# Patient Record
Sex: FEMALE | Race: WHITE
[De-identification: names, ages, dates, MRNs, and addresses within clinical notes are randomized per-mention and may not be internally consistent; named-entity substitution may affect disease eponyms.]

---

## 2017-07-18 ENCOUNTER — HOSPITAL ENCOUNTER (EMERGENCY)
Dept: HOSPITAL 14 - H.ER | Age: 22
Discharge: HOME | End: 2017-07-18
Payer: MEDICAID

## 2017-07-18 VITALS
RESPIRATION RATE: 18 BRPM | HEART RATE: 78 BPM | DIASTOLIC BLOOD PRESSURE: 73 MMHG | TEMPERATURE: 98 F | SYSTOLIC BLOOD PRESSURE: 119 MMHG

## 2017-07-18 VITALS — OXYGEN SATURATION: 99 %
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
## 2017-07-18 DIAGNOSIS — R55: Primary | ICD-10-CM

## 2017-07-18 DIAGNOSIS — S09.90XA: ICD-10-CM

## 2017-07-18 LAB
ALBUMIN SERPL-MCNC: 4.8 G/DL (ref 3.5–5)
ALBUMIN/GLOB SERPL: 1.2 {RATIO} (ref 1–2.1)
ALT SERPL-CCNC: 40 U/L (ref 9–52)
AST SERPL-CCNC: 34 U/L (ref 14–36)
BASOPHILS # BLD AUTO: 0 K/UL (ref 0–0.2)
BASOPHILS NFR BLD: 0.7 % (ref 0–2)
BUN SERPL-MCNC: 11 MG/DL (ref 7–17)
CALCIUM SERPL-MCNC: 9.6 MG/DL (ref 8.4–10.2)
EOSINOPHIL # BLD AUTO: 0 K/UL (ref 0–0.7)
EOSINOPHIL NFR BLD: 0.5 % (ref 0–4)
ERYTHROCYTE [DISTWIDTH] IN BLOOD BY AUTOMATED COUNT: 18 % (ref 11.5–14.5)
GFR NON-AFRICAN AMERICAN: > 60
HGB BLD-MCNC: 10.3 G/DL (ref 12–16)
LYMPHOCYTES # BLD AUTO: 1.7 K/UL (ref 1–4.3)
LYMPHOCYTES NFR BLD AUTO: 25.7 % (ref 20–40)
MCH RBC QN AUTO: 23.7 PG (ref 27–31)
MCHC RBC AUTO-ENTMCNC: 31.6 G/DL (ref 33–37)
MCV RBC AUTO: 74.9 FL (ref 81–99)
MONOCYTES # BLD: 0.5 K/UL (ref 0–0.8)
MONOCYTES NFR BLD: 6.9 % (ref 0–10)
NEUTROPHILS # BLD: 4.4 K/UL (ref 1.8–7)
NEUTROPHILS NFR BLD AUTO: 66.2 % (ref 50–75)
NRBC BLD AUTO-RTO: 0.1 % (ref 0–0)
PLATELET # BLD: 275 K/UL (ref 130–400)
PMV BLD AUTO: 9 FL (ref 7.2–11.7)
RBC # BLD AUTO: 4.33 MIL/UL (ref 3.8–5.2)
WBC # BLD AUTO: 6.6 K/UL (ref 4.8–10.8)

## 2017-07-18 NOTE — ED PDOC
Syncope/Near Syncope/Dizziness


Time Seen by Provider: 17 16:51


Chief Complaint (Nursing): Syncope


Chief Complaint (Provider): Syncope 


History Per: Patient, EMS


History/Exam Limitations: no limitations


Onset/Duration Of Symptoms: Sudden Onset


Current Symptoms Are (Timing): Still Present


Activity At Onset Of Symptoms: Walking


Fall Associated With With Symptoms: Yes, No Injury As Result Of Fall


Severity: Mild


Additional Complaint(s): 


Adriana Mensah is a 23 y/o female presenting to the ER via EMS on 2017 after 

having a syncopal episode. Patient states she was walking her dog and felt 

overheated with a burning sensation in her chest and paraesthesia to her 

fingers bilaterally. Patient then had experienced syncope, prompting local 

bystanders to call 911. Upon arrival, patient is unaware of incontinence or 

convulsive activities but recalls trip to hospital without difficulty. Patient 

is currently complaining of headaches and mild dizziness. She denies any chest 

pain, shortness of breath, leg swelling, or abdominal complaints. Patient also 

notes she has never been pregnant (; P:0)








Past Medical History


Reviewed: Historical Data, Nursing Documentation, Vital Signs


Vital Signs: 





 Last Vital Signs











Temp  99.6 F   17 16:39


 


Pulse  85   17 16:39


 


Resp  16   17 16:39


 


BP  135/90   17 16:39


 


Pulse Ox  99   17 16:39














- Medical History


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 





- Surgical History


Other surgeries: multiple left eye surgeries since childhood





- Family History


Family History: States: Unknown Family Hx





- Social History


Current smoker - smoking cessation education provided: No


Alcohol: None


Drugs: Denies





- Allergies


Allergies/Adverse Reactions: 


 Allergies











Allergy/AdvReac Type Severity Reaction Status Date / Time


 


No Known Allergies Allergy   Verified 17 16:39














Review of Systems


ROS Statement: Except As Marked, All Systems Reviewed And Found Negative


Cardiovascular: Negative for: Chest Pain


Gastrointestinal: Negative for: Abdominal Pain


Neurological: Positive for: Headache, Dizziness





Physical Exam





- Reviewed


Nursing Documentation Reviewed: Yes


Vital Signs Reviewed: Yes





- Physical Exam


Appears: Positive for: Non-toxic, No Acute Distress (pt has a thin & frail 

apperance )


Head Exam: Positive for: ATRAUMATIC, NORMAL INSPECTION (no gross signs of head 

trauma ).  Negative for: NORMOCEPHALIC (L eye chronic abnormality)


Skin: Positive for: Normal Color.  Negative for: Rash


Eye Exam: Negative for: Normal appearance ((+) left eye abnormality with 

chronic loss of vision )


ENT: Positive for: Normal ENT Inspection.  Negative for: Pharyngeal Erythema, 

Tonsillar Exudate, Tonsillar Swelling


Neck: Positive for: Normal, Painless ROM, Supple


Cardiovascular/Chest: Positive for: Tachycardia (mild)


Respiratory: Positive for: Normal Breath Sounds.  Negative for: Respiratory 

Distress


Gastrointestinal/Abdominal: Positive for: Normal Exam, Soft.  Negative for: 

Tenderness


Extremity: Positive for: Normal ROM.  Negative for: Deformity, Swelling


Neurologic/Psych: Positive for: Alert, CNs II-XII (intact), Oriented, Gait (

stable ).  Negative for: Motor/Sensory Deficits





- Laboratory Results


Result Diagrams: 


 17 17:15





 17 17:15





- ECG


O2 Sat by Pulse Oximetry: 99





Medical Decision Making


Medical Decision Makin:51





Initial Impression- Syncope 





Initial Plan-


Workup for syncope including EKG, blood work, and CT brain given head trauma 

and headache. Pt will be endorsed to Dr. Quintanilla at 17:00, pending ED workup.








Documented by Pierre Quintanilla, acting as a scribe for Kevin Regalado III, DO.





All medical record entries made by the Scribe were at my direction and 

personally dictated by me. I have reviewed the chart and agree that the record 

accurately reflects my personal performance of the history, physical exam, 

medical decision making, and the department course for this patient. I have 

also personally directed, reviewed, and agree with the discharge instructions 

and disposition.








Disposition





- Clinical Impression


Clinical Impression: 


 Head injury, Syncope








- Patient ED Disposition


Is Patient to be Admitted: No


Counseled Patient/Family Regarding: Studies Performed





- Disposition


Referrals: 


[facility] [Outside] - 17


Disposition: Transfer of Care


Disposition Time: 17:00


Condition: STABLE


Additional Instructions: 


Return if not better in 3 days.


Instructions:  Syncope (ED), Head Injury (ED)


Patient Signed Over To: Aric Quintanilla


Handoff Comments: pending workup/dispo/diagnosis

## 2017-07-18 NOTE — CT
PROCEDURE:  CT HEAD WITHOUT CONTRAST.



HISTORY:

syncope head injury; hx L eye congenital abnormal



COMPARISON:

07/20/2011 



TECHNIQUE:

Axial computed tomography images were obtained through the head/brain 

without intravenous contrast.  



Radiation dose:



Total exam DLP = 1025.26 mGy-cm.



This CT exam was performed using one or more of the following dose 

reduction techniques: Automated exposure control, adjustment of the 

mA and/or kV according to patient size, and/or use of iterative 

reconstruction technique.



FINDINGS:



HEMORRHAGE:

No intracranial hemorrhage. 



BRAIN:

No mass effect or edema.  No atrophy or chronic microvascular 

ischemic changes.



VENTRICLES:

Unremarkable. No hydrocephalus. 



CALVARIUM:

Unremarkable.



PARANASAL SINUSES:

Unremarkable as visualized. No significant inflammatory changes.



MASTOID AIR CELLS:

Unremarkable as visualized. No inflammatory changes.



OTHER FINDINGS:

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 Stable 

finding compared to prior studies



IMPRESSION:

No acute intracranial abnormalities. No significant findings to 

account for the clinical presentation. No significant interval change 

compared to the prior examination(s).

## 2017-07-18 NOTE — ED PDOC
- Laboratory Results


Result Diagrams: 


 07/18/17 17:15





 07/18/17 17:15


Interpretation Of Abn Labs: 3.4 K





- ECG


ECG: Positive for: Interpreted By Me, Viewed By Me


ECG Rhythm: Positive for: Normal QRS, Normal ST Segment, Sinus Rhythm


O2 Sat by Pulse Oximetry: 99


Pulse Ox Interpretation: Normal





- CT Scan/US


  ** head


Other Rad Studies (CT/US): Read By Radiologist


Other Rad Interpretation: no acute





- Progress


ED Course And Treament: 





1850:  Stable.  AAOx3.  Pain free.  Tolerated PO.  Fu with pcp.  





Disposition


Counseled Patient/Family Regarding: Studies Performed, Diagnosis, Need For 

Followup





- Clinical Impression


Clinical Impression: 


 Head injury, Syncope








- POA


Present On Arrival: Falls Or Trauma





- Disposition


Referrals: 


[facility] [Outside] - 07/19/17


Disposition: Routine/Home


Disposition Time: 18:53


Condition: STABLE


Additional Instructions: 


Return if not better in 3 days.


Instructions:  Syncope (ED), Head Injury (ED)

## 2017-07-19 NOTE — CARD
--------------- APPROVED REPORT --------------





EKG Measurement

Heart [id number]

MI [id number]

[id number]

[id number]

[id number]



<Conclusion>

Normal sinus rhythm

Normal ECG

## 2018-07-09 ENCOUNTER — HOSPITAL ENCOUNTER (EMERGENCY)
Dept: HOSPITAL 14 - H.ER | Age: 23
Discharge: HOME | End: 2018-07-09
Payer: MEDICAID

## 2018-07-09 VITALS
DIASTOLIC BLOOD PRESSURE: 87 MMHG | RESPIRATION RATE: 18 BRPM | SYSTOLIC BLOOD PRESSURE: 121 MMHG | HEART RATE: 91 BPM | OXYGEN SATURATION: 98 % | TEMPERATURE: 98.3 F

## 2018-07-09 DIAGNOSIS — R30.0: Primary | ICD-10-CM

## 2018-07-09 PROCEDURE — 87591 N.GONORRHOEAE DNA AMP PROB: CPT

## 2018-07-09 PROCEDURE — 87086 URINE CULTURE/COLONY COUNT: CPT

## 2018-07-09 PROCEDURE — 87491 CHLMYD TRACH DNA AMP PROBE: CPT

## 2018-07-09 PROCEDURE — 96372 THER/PROPH/DIAG INJ SC/IM: CPT

## 2018-07-09 PROCEDURE — 99283 EMERGENCY DEPT VISIT LOW MDM: CPT

## 2018-07-09 PROCEDURE — 81025 URINE PREGNANCY TEST: CPT

## 2018-07-09 NOTE — ED PDOC
HPI: Female  Pain


Time Seen by Provider: 07/09/18 17:40


Chief Complaint (Nursing): Female Genitourinary


Chief Complaint (Provider): Female Genitourinary


History Per: Patient


History/Exam Limitations: no limitations


Onset/Duration Of Symptoms: Days


Current Symptoms Are (Timing): Still Present


Additional Complaint(s): 


22 y/o female presents to the ED complaining of dysuria, onset last night. 

Patient states last night she developed dysuria which persisted this morning. 

Patient reports that she had clear vaginal discharge. Patient states that she 

is not sexually active and has never had intercourse. Denies vaginal pruritus, 

urinary frequency, urinary urgency, back pain, and hematuria.





PMD: None Provided





Past Medical History


Reviewed: Historical Data, Nursing Documentation, Vital Signs


Vital Signs: 





 Last Vital Signs











Temp  98.3 F   07/09/18 17:08


 


Pulse  91 H  07/09/18 17:08


 


Resp  18   07/09/18 17:08


 


BP  121/87   07/09/18 17:08


 


Pulse Ox  98   07/09/18 17:08














- Medical History


PMH: No Chronic Diseases





- Surgical History


Surgical History: No Surg Hx





- Family History


Family History: States: Unknown Family Hx





- Allergies


Allergies/Adverse Reactions: 


 Allergies











Allergy/AdvReac Type Severity Reaction Status Date / Time


 


No Known Allergies Allergy   Verified 07/09/18 17:08














Review of Systems


ROS Statement: Except As Marked, All Systems Reviewed And Found Negative


Genitourinary Female: Positive for: Dysuria, Vaginal Discharge, Other (vaginal 

pruritus).  Negative for: Frequency, Hematuria


Musculoskeletal: Negative for: Back Pain





Physical Exam





- Reviewed


Nursing Documentation Reviewed: Yes


Vital Signs Reviewed: Yes





- Physical Exam


Appears: Positive for: No Acute Distress


Skin: Positive for: Normal Color, Warm, Dry.  Negative for: Rash


Gastrointestinal/Abdominal: Positive for: Normal Exam, Soft.  Negative for: 

Tenderness


Back: Positive for: Normal Inspection.  Negative for: L CVA Tenderness, R CVA 

Tenderness





- ECG


O2 Sat by Pulse Oximetry: 98 (RA)


Pulse Ox Interpretation: Normal





Medical Decision Making


Medical Decision Making: 


Time: 1759


Plan:


-- ED Urine Pregnancy


-- ED Urine Dipstick


-- Chlamydia/GC RNA, TMA


-- Rocephin 50 mg IM


-- Zithromax 1000 mg PO


-- Urine C&S





-- Patient informed that ED Urine Dip results showed no UTI. Therefore, a 

pelvic exam and STD testing is to be done. 





-- Patient refused pelvic exam but does want prophylactic medications for 

chlamydia despite not being sexually active. 





_______________________________________________________________________


Scribe Attestation:


Documented by Usman Brady, acting as a scribe for Dhaval Munguia PA-C.





Provider Scribe Attestation:


All medical record entries made by the Scribe were at my direction and 

personally dictated by me. I have reviewed the chart and agree that the record 

accurately reflects my personal performance of the history, physical exam, 

medical decision making, and the department course for this patient. I have 

also personally directed, reviewed, and agree with the discharge instructions 

and disposition.





Disposition





- Clinical Impression


Clinical Impression: 


 Dysuria








- Patient ED Disposition


Is Patient to be Admitted: No





- Disposition


Referrals: 


[facility] [Outside]


[facility] [Outside]


Disposition: Routine/Home


Disposition Time: 18:30


Condition: STABLE


Additional Instructions: 


Follow up with [facility] for further evaluation.


Return to ED immediately if symptoms worsen. 


Instructions:  Dysuria, Adult (DC)


Forms:  [facility] (English)


Print Language: ENGLISH